# Patient Record
Sex: MALE | Race: WHITE | NOT HISPANIC OR LATINO | Employment: STUDENT | URBAN - METROPOLITAN AREA
[De-identification: names, ages, dates, MRNs, and addresses within clinical notes are randomized per-mention and may not be internally consistent; named-entity substitution may affect disease eponyms.]

---

## 2023-08-29 ENCOUNTER — HOSPITAL ENCOUNTER (EMERGENCY)
Facility: CLINIC | Age: 23
Discharge: HOME | End: 2023-08-30
Attending: EMERGENCY MEDICINE
Payer: COMMERCIAL

## 2023-08-29 DIAGNOSIS — Q67.6 PECTUS EXCAVATUM: ICD-10-CM

## 2023-08-29 DIAGNOSIS — R07.9 CHEST PAIN, UNSPECIFIED TYPE: ICD-10-CM

## 2023-08-29 LAB
ALBUMIN SERPL BCG-MCNC: 5 G/DL (ref 3.5–5.2)
ALBUMIN UR-MCNC: NEGATIVE MG/DL
ALP SERPL-CCNC: 80 U/L (ref 40–129)
ALT SERPL W P-5'-P-CCNC: 13 U/L (ref 0–70)
ANION GAP SERPL CALCULATED.3IONS-SCNC: 14 MMOL/L (ref 7–15)
APPEARANCE UR: CLEAR
AST SERPL W P-5'-P-CCNC: 23 U/L (ref 0–45)
ATRIAL RATE - MUSE: 72 BPM
BACTERIA #/AREA URNS HPF: ABNORMAL /HPF
BASOPHILS # BLD AUTO: 0.1 10E3/UL (ref 0–0.2)
BASOPHILS NFR BLD AUTO: 1 %
BILIRUB SERPL-MCNC: 0.5 MG/DL
BILIRUB UR QL STRIP: NEGATIVE
BUN SERPL-MCNC: 14.6 MG/DL (ref 6–20)
CALCIUM SERPL-MCNC: 9.8 MG/DL (ref 8.6–10)
CHLORIDE SERPL-SCNC: 101 MMOL/L (ref 98–107)
COLOR UR AUTO: ABNORMAL
CREAT SERPL-MCNC: 1.04 MG/DL (ref 0.67–1.17)
DEPRECATED HCO3 PLAS-SCNC: 24 MMOL/L (ref 22–29)
DIASTOLIC BLOOD PRESSURE - MUSE: NORMAL MMHG
EOSINOPHIL # BLD AUTO: 0.2 10E3/UL (ref 0–0.7)
EOSINOPHIL NFR BLD AUTO: 1 %
ERYTHROCYTE [DISTWIDTH] IN BLOOD BY AUTOMATED COUNT: 11.5 % (ref 10–15)
GFR SERPL CREATININE-BSD FRML MDRD: >90 ML/MIN/1.73M2
GLUCOSE SERPL-MCNC: 99 MG/DL (ref 70–99)
GLUCOSE UR STRIP-MCNC: NEGATIVE MG/DL
HCT VFR BLD AUTO: 44.8 % (ref 40–53)
HGB BLD-MCNC: 15 G/DL (ref 13.3–17.7)
HGB UR QL STRIP: NEGATIVE
IMM GRANULOCYTES # BLD: 0.1 10E3/UL
IMM GRANULOCYTES NFR BLD: 0 %
INR PPP: 1.12 (ref 0.85–1.15)
INTERPRETATION ECG - MUSE: NORMAL
KETONES UR STRIP-MCNC: NEGATIVE MG/DL
LEUKOCYTE ESTERASE UR QL STRIP: NEGATIVE
LYMPHOCYTES # BLD AUTO: 2.5 10E3/UL (ref 0.8–5.3)
LYMPHOCYTES NFR BLD AUTO: 19 %
MCH RBC QN AUTO: 30.2 PG (ref 26.5–33)
MCHC RBC AUTO-ENTMCNC: 33.5 G/DL (ref 31.5–36.5)
MCV RBC AUTO: 90 FL (ref 78–100)
MONOCYTES # BLD AUTO: 0.8 10E3/UL (ref 0–1.3)
MONOCYTES NFR BLD AUTO: 6 %
MUCOUS THREADS #/AREA URNS LPF: PRESENT /LPF
NEUTROPHILS # BLD AUTO: 9.2 10E3/UL (ref 1.6–8.3)
NEUTROPHILS NFR BLD AUTO: 73 %
NITRATE UR QL: NEGATIVE
NRBC # BLD AUTO: 0 10E3/UL
NRBC BLD AUTO-RTO: 0 /100
P AXIS - MUSE: 68 DEGREES
PH UR STRIP: 7 [PH] (ref 5–7)
PLATELET # BLD AUTO: 339 10E3/UL (ref 150–450)
POTASSIUM SERPL-SCNC: 3.6 MMOL/L (ref 3.4–5.3)
PR INTERVAL - MUSE: 136 MS
PROT SERPL-MCNC: 8 G/DL (ref 6.4–8.3)
QRS DURATION - MUSE: 88 MS
QT - MUSE: 384 MS
QTC - MUSE: 420 MS
R AXIS - MUSE: 56 DEGREES
RBC # BLD AUTO: 4.96 10E6/UL (ref 4.4–5.9)
RBC URINE: 0 /HPF
SODIUM SERPL-SCNC: 139 MMOL/L (ref 136–145)
SP GR UR STRIP: 1 (ref 1–1.03)
SYSTOLIC BLOOD PRESSURE - MUSE: NORMAL MMHG
T AXIS - MUSE: 33 DEGREES
TROPONIN T SERPL HS-MCNC: <6 NG/L
UROBILINOGEN UR STRIP-MCNC: NORMAL MG/DL
VENTRICULAR RATE- MUSE: 72 BPM
WBC # BLD AUTO: 12.8 10E3/UL (ref 4–11)
WBC URINE: 2 /HPF

## 2023-08-29 PROCEDURE — 85610 PROTHROMBIN TIME: CPT | Performed by: EMERGENCY MEDICINE

## 2023-08-29 PROCEDURE — 80053 COMPREHEN METABOLIC PANEL: CPT | Performed by: EMERGENCY MEDICINE

## 2023-08-29 PROCEDURE — 99285 EMERGENCY DEPT VISIT HI MDM: CPT | Mod: 25 | Performed by: EMERGENCY MEDICINE

## 2023-08-29 PROCEDURE — 93005 ELECTROCARDIOGRAM TRACING: CPT | Performed by: EMERGENCY MEDICINE

## 2023-08-29 PROCEDURE — 84484 ASSAY OF TROPONIN QUANT: CPT | Performed by: EMERGENCY MEDICINE

## 2023-08-29 PROCEDURE — 93010 ELECTROCARDIOGRAM REPORT: CPT | Performed by: EMERGENCY MEDICINE

## 2023-08-29 PROCEDURE — 36415 COLL VENOUS BLD VENIPUNCTURE: CPT | Performed by: EMERGENCY MEDICINE

## 2023-08-29 PROCEDURE — 85025 COMPLETE CBC W/AUTO DIFF WBC: CPT | Performed by: EMERGENCY MEDICINE

## 2023-08-29 PROCEDURE — 81003 URINALYSIS AUTO W/O SCOPE: CPT | Performed by: EMERGENCY MEDICINE

## 2023-08-29 ASSESSMENT — ACTIVITIES OF DAILY LIVING (ADL)
ADLS_ACUITY_SCORE: 33
ADLS_ACUITY_SCORE: 33

## 2023-08-30 ENCOUNTER — APPOINTMENT (OUTPATIENT)
Dept: CT IMAGING | Facility: CLINIC | Age: 23
End: 2023-08-30
Attending: EMERGENCY MEDICINE
Payer: COMMERCIAL

## 2023-08-30 VITALS
HEART RATE: 109 BPM | DIASTOLIC BLOOD PRESSURE: 72 MMHG | OXYGEN SATURATION: 92 % | SYSTOLIC BLOOD PRESSURE: 110 MMHG | TEMPERATURE: 97.8 F | RESPIRATION RATE: 18 BRPM

## 2023-08-30 PROCEDURE — 250N000011 HC RX IP 250 OP 636: Performed by: EMERGENCY MEDICINE

## 2023-08-30 PROCEDURE — 71275 CT ANGIOGRAPHY CHEST: CPT

## 2023-08-30 PROCEDURE — 250N000009 HC RX 250: Performed by: EMERGENCY MEDICINE

## 2023-08-30 PROCEDURE — 71275 CT ANGIOGRAPHY CHEST: CPT | Mod: 26 | Performed by: RADIOLOGY

## 2023-08-30 RX ORDER — IOPAMIDOL 755 MG/ML
90 INJECTION, SOLUTION INTRAVASCULAR ONCE
Status: COMPLETED | OUTPATIENT
Start: 2023-08-30 | End: 2023-08-30

## 2023-08-30 RX ADMIN — IOPAMIDOL 90 ML: 755 INJECTION, SOLUTION INTRAVENOUS at 00:11

## 2023-08-30 RX ADMIN — SODIUM CHLORIDE, PRESERVATIVE FREE 90 ML: 5 INJECTION INTRAVENOUS at 00:16

## 2023-08-30 ASSESSMENT — ACTIVITIES OF DAILY LIVING (ADL): ADLS_ACUITY_SCORE: 35

## 2023-08-30 NOTE — ED TRIAGE NOTES
Presents to the ED with concerns of Center chest pain with pain down the L arm, jaw and tongue tingling. Started earlier today. Has had this symptoms in the past and has been seen for it. However, symptoms tonight are worse than previous episodes. Denies hx of anxiety and drug use.      Triage Assessment       Row Name 08/29/23 1918       Triage Assessment (Adult)    Airway WDL WDL       Respiratory WDL    Respiratory WDL WDL       Skin Circulation/Temperature WDL    Skin Circulation/Temperature WDL WDL       Cardiac WDL    Cardiac WDL WDL       Cognitive/Neuro/Behavioral WDL    Cognitive/Neuro/Behavioral WDL WDL

## 2023-08-30 NOTE — ED PROVIDER NOTES
ED Provider Note  [facility]      History     Chief Complaint   Patient presents with    Chest Pain     HPI  Connor Tejeda is a 23 year old male who has a PMH notable for pectus excavatum (no prior surgical intervention for such), nut allergy, otherwise reportedly healthy, presenting today w/ recurrent chest pain.     Patient presents today in concern for chest discomfort.  He reports he has had similar in the past.  Says was evaluated for such, had ECG and was told was likely inflammation of his ribs/chest wall.  He has not been evaluated for this further. [Outside of his known pectus excavatum, and nut allergy, they report he is otherwise healthy/ has no known other known medical conditions.  No known cardiac diseases/conditions, no contact connective tissue conditions, no known Marfan diagnoses, etc. They report they have looked into at least the some Internet research potential evaluations for his pectus excavatum (at [place], etc.).  They have been told in the past that he will likely need a CT to evaluate this further but I have not yet had that performed.]    Patient reports his chest discomfort has been present consistently throughout the day today (present constantly for hours/all day).has had multiple times in the past.  Previously more intermittent, but same location, same type of discomfort.  Otherwise no new features or changes.  Located central chest, occasional radiation of pain to left jaw, UE. Occasional radiation of pain to the back.  No clear trigger for anything that makes better or worse.  No particular patterns noted for position, eating, activity, etc.  No LH, dizziness, syncope or near syncope. No fevers or chills. No shortness of breath. No palpitations. No extremity pain or swelling.  Has occasionally had tongue tingling sensation, but not currently.  No other numbness, tingling or weakness.  No abdominal pain.  No nausea or vomiting.  No change to bowel or  bladder.  No rashes or skin changes.  No known history of anxiety, no drug use.      No other new symptoms or complaints at this time. Full ROS completed w/o additional findings.           Past Medical History  History reviewed. No pertinent past medical history.  Pectus excavatum, nut allergy  History reviewed. No pertinent surgical history.  None pertinent reported  No current outpatient medications on file.    Allergies   Allergen Reactions    Peanut (Diagnostic) Nausea and Hives    Tree Nuts [Nuts] Nausea and Hives     Family History  History reviewed. No pertinent family history.  Social History   Social History     Tobacco Use    Smoking status: Never    Smokeless tobacco: Never   Substance Use Topics    Alcohol use: Yes     Comment: 1-2x per week    Drug use: Never      Past medical history, past surgical history, medications, allergies, family history, and social history were reviewed with the patient. No additional pertinent items.      A complete review of systems was performed with pertinent positives and negatives noted in the HPI, and all other systems negative.    Physical Exam   BP: 130/77  Pulse: 91  Temp: 97.8  F (36.6  C)  Resp: 18  SpO2: 99 %  Physical Exam  CONSTITUTIONAL: Well-developed and well-nourished. Awake and alert. Non-toxic appearance. No acute distress.  Relatively tall and thin with pectus excavatum noted.  HENT:   - Head: Normocephalic and atraumatic.   - Ears: External ear grossly normal.   - Nose: Nose normal. No rhinorrhea. No epistaxis.   - Mouth/Throat: MMM  EYES: Conjunctivae and lids are normal. No scleral icterus.   NECK: Normal range of motion and phonation normal. Neck supple.  No tracheal deviation, no stridor. No edema or erythema noted.  CARDIOVASCULAR: Normal rate, regular rhythm and no appreciable abnormal heart sounds.  PULMONARY/CHEST: Normal work of breathing. No accessory muscle usage or stridor. No respiratory distress.  No appreciable abnormal breath sounds.   Does have anatomy morphology consistent with pectus excavatum.  No obvious crepitus or abnormal palpation findings.  ABDOMEN: Soft, non-distended. No tenderness. No peritoneal findings, no rigidity, rebound or guarding.  No palpable masses or abnormal pulsatility appreciated.  MUSCULOSKELETAL: Extremities warm and seemingly well perfused. No edema or calf tenderness.  NEUROLOGIC: Awake, alert. Not disoriented. No seizure activity. GCS 15  SKIN: Skin is warm and dry. No rash noted. No diaphoresis. No pallor.   PSYCHIATRIC: Normal mood and affect. Speech and behavior normal. Thought processes linear. Cognition and memory are normal. No SI/HI reported.      ED Course, Procedures, & Data            EKG Interpretation:      Interpreted by Janice Michael MD  Time reviewed: 2030  Symptoms at time of EKG: Chest pain  Rhythm: Sinus  Rate: Normal  Axis: Normal  Ectopy: None  Conduction: RSR' morphology in V1 and V2  ST Segments/ T Waves: No sonia ST elevation or depression.  Does have some RSR prime morphology in V1 and V2, with inverted T wave in V1, slightly lead III  Comparison to prior: vs 4 May 2023 (pt had copy of prior outside ECG with him today); heart rate is increased from 56 bpm to 72 bpm currently.  May have a bit more TWI in lead III currently.  Did seem to have the RSR prime with TWI in V1 on previous ECG (present in V2 as well today, but could be lead placement variability) may have slight extra bump in the T wave of V3, cannot exclude U wave, but looks a bit more notable than on prior ECG (scanning outside ECG into our EMR, media/photo tab)  Clinical Impression: Sinus, some variation though relatively similar to prior as described above            Critical care was not performed.     Medical Decision Making  The patient's presentation was of moderate complexity (an undiagnosed new problem with uncertain diagnosis).    The patient's evaluation involved:  review of external note(s) from 1 sources (see separate  area of note for details)  review of 3+ test result(s) ordered prior to this encounter (see separate area of note for details)  ordering and/or review of 3+ test(s) in this encounter (see separate area of note for details)    The patient's management necessitated moderate to high - offered obs admission, pt declined. He will arrange close outpatient F/U w/ Cards and PCP    Assessment & Plan    IMPRESSION:   23 year old male w/ PMH notable forpectus excavatum (no prior surgical intervention for such), nut allergy, otherwise reportedly healthy, presenting today w/ recurrent chest discomfort.    Clinically, patient appears nontoxic, NAD.  Vitals grossly WNL.  Otherwise on examination, he is noted to have pectus excavatum.  He is relatively thin, somewhat long extremities, not clearly marfanoid, but has some features that make me somewhat suspicious for such.  No other obvious cardiopulmonary findings or abdominal exam findings.  No obvious extremity findings for PE, etc.    Ddx includes, but not limited to, sx's related to pectus excavatum, dissection, does not sound classically pleuritic for PE, but did have recent flight. Also therein does not sound classic for pericarditis, pericardial effusion. Think unlikey PTX, no vomiting for yessy-kimball/Boerrhave. Less likely ACS. No other sx's for dysrhythmia. Considered other causes, pleurisy, costochonritis, et al, but thought to be more diagnosis of exclusion., GI cause, chest wall/MSK, anxiety, et al.     PLAN:   - Screening ECG, laboratory studies, chest imaging   - Risks/benefits of pursuing imaging reviewed with patient and his parents and accepted.   - Dispo pending ED course    RESULTS:  Labs:   - WBC 12.8, but no infectious findings  - Hgb 15.0, troponin less than 6 (and symptoms have been present all day, constantly and so I think the likelihood of ACS to be very low)  -No acute findings on CMP  Urine:   - No apparent UTI, no blood  Imaging: Written preliminary  reports reviewed:  - CT Chest:   1.  No evidence of pulmonary embolism.  2.  No evidence of thoracoabdominal aortic aneurysm or dissection.  3.  9 mm right thyroid nodule, can be further evaluated with thyroid ultrasound if clinically warranted.  4.  Mild pectus excavatum chest deformity.  5.  1 cm enhancing observation of the spleen, indeterminate, could represent small splenic hemangioma or hamartoma. Can be further evaluated with contrast-enhanced MRI on nonemergent basis for more detailed characterization if clinically warranted.    Results/reports reviewed w/ patient who expresses understanding of findings and F/U recommendations. Reviewed incidental findings and provided report copy of imaging. They will arrange further outpatient workup for current sx's as well as incidental findings. (His PCP is at outside facility, but also placed an order to see our Cards team).     RE-EVALUATION:  - The patient's symptoms were improved  - Pt otherwise continues to do well here in the ED, no acute issues or apparent concerning changes in vitals or clinical appearance.    DISCUSSIONS:  - w/ Patient: I have reviewed the available findings, options/recommendations. Offered/recommended Obs, could have Cardiology see here in hospital, etc.. Pt declining obs admission at this time, but understands can change his mind and return at anytime, and especially recommend he immediately return to ED w/ any new/worsening symptoms or any concerns. He does agree to close outpatient F/U for today's visits/sx's as well as the incidental findings. He will see/contact PCP, as well as Cardiology (urgent referral order placed).   - pt awake, alert, not intoxicated, understands risks. Appears to have decision making capacity at this time.   - Given patient wants to leave, reviewed presentation, findings, limitations of findings and to not ignore sx's/needs to continue workup/management, reviewed plan, need for close follow up, strict  return/safety instructions with the patient and his parents. They expressed understanding and agreement with this plan. All questions answered to the best of our ability at this time.       DISPOSITION/PLANNING:  IMPRESSION:   - Chest pain  - Pectus Excavatum  - Incidental thyroid nodule  - Incidental splenic finding  DISPOSITION:  - Discharge   FOLLOW-UP:   - PCP and Cardiology (referral order placed). Pt to call in AM to arrange.   OTHER RECOMMENDATIONS:   - Conservative symptom management, strict return instructions    ______________________________________________________________________              Janice Michael MD  [facility], located within [facility] EMERGENCY DEPARTMENT  8/29/2023     Janice Michael MD  08/30/23 2005

## 2023-08-30 NOTE — DISCHARGE INSTRUCTIONS
TODAY'S VISIT:  You were seen today for chest pain.   - The exact cause of your symptoms is not yet known.  -Therefore, it is very important that you follow-up closely with Primary Care, and we have also made an urgent referral for you to our Cardiology team to be evaluated as well  - You should discuss all imaging/radiology tests and laboratory tests that were performed during this visit with your usual providers to ensure you continue to improve and do not need any further evaluation, testing or management.   - Please call your Primary Care team to discuss and arrange a follow-up appointment.   Immediately return to the nearest Emergency Department with any new or worsening symptoms or concerns.    FOLLOW-UP:  Please make an appointment to follow up with:  - Your Primary Care Provider and Cardiology Clinic (phone: [phone]) as soon as possible (ideally to be seen within the next couple of days).  - If you do not have a primary care provider, you can be seen in follow-up and establish care with one of our providers by calling of the the clinics below:  --- [facility] (phone: [phone])  --- Primary Care / [facility] (phone: [phone])   - Have your provider review the results from today's visit with you again to make sure no further follow-up or additional testing is needed based on those results.     OTHER INSTRUCTIONS:  - Do your best to stay hydrated.    RETURN TO THE EMERGENCY DEPARTMENT  Return to the Emergency Department immediately for any new or worsening symptoms or any concerns.     Remember that you can always come back to the Emergency Department if you are not able to see your regular doctor in the amount of time listed above, if you get any new symptoms, or if there is anything that worries you.

## 2023-09-07 ENCOUNTER — OFFICE VISIT (OUTPATIENT)
Dept: CARDIOLOGY | Facility: CLINIC | Age: 23
End: 2023-09-07
Attending: EMERGENCY MEDICINE
Payer: COMMERCIAL

## 2023-09-07 ENCOUNTER — LAB (OUTPATIENT)
Dept: LAB | Facility: CLINIC | Age: 23
End: 2023-09-07
Payer: COMMERCIAL

## 2023-09-07 VITALS
DIASTOLIC BLOOD PRESSURE: 68 MMHG | HEART RATE: 72 BPM | SYSTOLIC BLOOD PRESSURE: 103 MMHG | OXYGEN SATURATION: 99 % | BODY MASS INDEX: 18.61 KG/M2 | HEIGHT: 70 IN | WEIGHT: 130 LBS

## 2023-09-07 DIAGNOSIS — Q67.6 PECTUS EXCAVATUM: ICD-10-CM

## 2023-09-07 DIAGNOSIS — R07.9 CHEST PAIN, UNSPECIFIED TYPE: ICD-10-CM

## 2023-09-07 LAB — CRP SERPL-MCNC: <3 MG/L

## 2023-09-07 PROCEDURE — 36415 COLL VENOUS BLD VENIPUNCTURE: CPT | Performed by: INTERNAL MEDICINE

## 2023-09-07 PROCEDURE — 86140 C-REACTIVE PROTEIN: CPT | Performed by: INTERNAL MEDICINE

## 2023-09-07 PROCEDURE — 99204 OFFICE O/P NEW MOD 45 MIN: CPT | Performed by: INTERNAL MEDICINE

## 2023-09-07 NOTE — PROGRESS NOTES
CARDIOLOGY CLINIC CONSULTATION    PRIMARY CARE PHYSICIAN:  Physician No Ref-Primary    HISTORY OF PRESENT ILLNESS:  This is a 23-year-old extremely pleasant man who is here with his mother.  The patient denies any prior major medical problems.  He says his pediatrician diagnosed him to have pectus excavatum him a few years ago.  He has not had any significant issues related to that.  More recently the patient has been experiencing chest discomfort and this entire summer.  This pain is positional sometimes and even changes with deep breaths.  This is not exertional in nature.  He denies any sudden cardiac death in his family.  No angina syncope presyncope reported.  He denies any recent febrile illnesses.  He denies any genetic syndromes like Marfan's etc. or any joint mobility issues.    PAST MEDICAL HISTORY:  None pertinent to this cardiology consultation    MEDICATIONS:  No current outpatient medications on file.     No current facility-administered medications for this visit.     None    SOCIAL HISTORY:  I have reviewed this patient's social history and updated it with pertinent information if needed. Connor PENG Ileana  reports that he has never smoked. He has never used smokeless tobacco. He reports current alcohol use. He reports that he does not use drugs.    PHYSICAL EXAM:  Pulse:  [72] 72  BP: (103)/(68) 103/68  SpO2:  [99 %] 99 %  130 lbs 0 oz    Constitutional: alert, no distress  Respiratory: Good bilateral air entry  Cardiovascular: Normal regular heart sounds obvious pectus noted.  JVP is normal there is no edema heart sounds are somewhat displaced GI: nondistended  Neuropsychiatric: appropriate affact    ASSESSMENT: Pertinent issues addressed/ reviewed during this cardiology visit  Chest discomfort likely related to pectus deformity    RECOMMENDATIONS:  Patient's chest pain definitely does not sound anginal in nature.  Similarly I do not think this is myopericarditis in origin.  I think it is reasonable  to get a exercise stress echocardiogram and CRP levels.  However I personally recommend referral to [facility] or [facility] in the thoracic clinic for an opinion about a significant pectus deformity.   We will contact the patient with the results of his stress test.    It was a pleasure seeing this patient in clinic today. Please do not hesitate to contact me with any future questions.     ANTONY Narvaez, [facility]  Cardiology - [facility]  September 7, 2023    Review of the result(s) of each unique test - Last ECG lipids and BMP CBC     The level of medical decision making during this visit was of moderate complexity.    This note was completed in part using dictation via the Dragon voice recognition software. Some word and grammatical errors may occur and must be interpreted in the appropriate clinical context.  If there are any questions pertaining to this issue, please contact me for further clarification.   Anesthesia Volume In Cc: 3

## 2023-09-07 NOTE — LETTER
9/7/2023    Physician No Ref-Primary  No address on file    RE: Connor Tejeda       Dear Colleague,     I had the pleasure of seeing Connor Tejeda in the [facility].  CARDIOLOGY CLINIC CONSULTATION    PRIMARY CARE PHYSICIAN:  Physician No Ref-Primary    HISTORY OF PRESENT ILLNESS:  This is a 23-year-old extremely pleasant man who is here with his mother.  The patient denies any prior major medical problems.  He says his pediatrician diagnosed him to have pectus excavatum him a few years ago.  He has not had any significant issues related to that.  More recently the patient has been experiencing chest discomfort and this entire summer.  This pain is positional sometimes and even changes with deep breaths.  This is not exertional in nature.  He denies any sudden cardiac death in his family.  No angina syncope presyncope reported.  He denies any recent febrile illnesses.  He denies any genetic syndromes like Marfan's etc. or any joint mobility issues.    PAST MEDICAL HISTORY:  None pertinent to this cardiology consultation    MEDICATIONS:  No current outpatient medications on file.     No current facility-administered medications for this visit.     None    SOCIAL HISTORY:  I have reviewed this patient's social history and updated it with pertinent information if needed. Connor Tejeda  reports that he has never smoked. He has never used smokeless tobacco. He reports current alcohol use. He reports that he does not use drugs.    PHYSICAL EXAM:  Pulse:  [72] 72  BP: (103)/(68) 103/68  SpO2:  [99 %] 99 %  130 lbs 0 oz    Constitutional: alert, no distress  Respiratory: Good bilateral air entry  Cardiovascular: Normal regular heart sounds obvious pectus noted.  JVP is normal there is no edema heart sounds are somewhat displaced GI: nondistended  Neuropsychiatric: appropriate affact    ASSESSMENT: Pertinent issues addressed/ reviewed during this cardiology visit  Chest discomfort likely related  to pectus deformity    RECOMMENDATIONS:  Patient's chest pain definitely does not sound anginal in nature.  Similarly I do not think this is myopericarditis in origin.  I think it is reasonable to get a exercise stress echocardiogram and CRP levels.  However I personally recommend referral to [facility] or [facility] in the thoracic clinic for an opinion about a significant pectus deformity.   We will contact the patient with the results of his stress test.    It was a pleasure seeing this patient in clinic today. Please do not hesitate to contact me with any future questions.     ANTONY Narvaez, [facility]  Cardiology - [facility]  September 7, 2023    Review of the result(s) of each unique test - Last ECG lipids and BMP CBC     The level of medical decision making during this visit was of moderate complexity.    This note was completed in part using dictation via the Dragon voice recognition software. Some word and grammatical errors may occur and must be interpreted in the appropriate clinical context.  If there are any questions pertaining to this issue, please contact me for further clarification.    Thank you for allowing me to participate in the care of your patient.      Sincerely,     Azra Licona MD     [facility]  cc:   Janice Michael MD  [address] [place] [address]

## 2023-09-08 ENCOUNTER — PATIENT OUTREACH (OUTPATIENT)
Dept: ONCOLOGY | Facility: CLINIC | Age: 23
End: 2023-09-08
Payer: COMMERCIAL

## 2023-09-08 ENCOUNTER — CARE COORDINATION (OUTPATIENT)
Dept: CARDIOLOGY | Facility: CLINIC | Age: 23
End: 2023-09-08
Payer: COMMERCIAL

## 2023-09-08 NOTE — PROGRESS NOTES
New Patient Oncology Nurse Navigator Note     Referring provider: Dr. Azra Licona    Referring Clinic/Organization: [facility]  Referred to: Thoracic Surgery  Requested provider (if applicable): First available - did not specify   Referral Received: 09/08/23       Evaluation for :   Diagnosis   R07.9 (ICD-10-CM) - Chest pain, unspecified type   Q67.6 (ICD-10-CM) - Pectus excavatum     Clinical History (per Nurse review of records provided):      09/08/2023 CTA Chest w/ contrast (bookmarked) showed:   IMPRESSION:  1.  No evidence of pulmonary embolism.  2.  No evidence of thoracoabdominal aortic aneurysm or dissection.  3.  9 mm right thyroid nodule, can be further evaluated with thyroid ultrasound if clinically warranted.  4.  Mild pectus excavatum chest deformity.  5.  1 cm enhancing observation of the spleen, indeterminate, could represent small splenic hemangioma or hamartoma. Can be further evaluated with contrast-enhanced MRI on nonemergent basis for more detailed characterization if clinically warranted.    Clinical Assessment / Barriers to Care (Per Nurse):    Never smoker  Records Location: [facility]   Records Needed: None  Additional testing needed prior to consult: Exercise ECHO (scheduled 9/11)    YOSEF FowlerN, RN, OCN  [facility] Oncology Nurse Navigator  [phone] / [phone]

## 2023-09-08 NOTE — PROGRESS NOTES
Will route request to CARYN Guerra Nurse Navigator, who placed a note regarding incoming consult from Dr. Licona. Danielle Chan RN on 9/8/2023 at 12:30 PM        Result Care Coordination      Result Notes     Azra Licona MD  9/8/2023  9:28 AM CDT Back to Top      Thank you. Normal levels.     I spoke with Dr. Tiffany Pink to get an opinion on his CT pectus and chest pain. She recommended a referral to Dr. Balbina Rogers with thoracic. Can we help with that thanks, K    Danielle Chan RN  9/7/2023  4:33 PM CDT       FYI to MD. Pt sent mychart result note w/normal result.

## 2023-09-11 ENCOUNTER — HOSPITAL ENCOUNTER (OUTPATIENT)
Dept: CARDIOLOGY | Facility: CLINIC | Age: 23
Discharge: HOME | End: 2023-09-11
Attending: INTERNAL MEDICINE | Admitting: INTERNAL MEDICINE
Payer: COMMERCIAL

## 2023-09-11 DIAGNOSIS — R07.9 CHEST PAIN, UNSPECIFIED TYPE: ICD-10-CM

## 2023-09-11 DIAGNOSIS — Q67.6 PECTUS EXCAVATUM: ICD-10-CM

## 2023-09-11 PROCEDURE — 93017 CV STRESS TEST TRACING ONLY: CPT

## 2023-09-11 PROCEDURE — 93016 CV STRESS TEST SUPVJ ONLY: CPT | Performed by: INTERNAL MEDICINE

## 2023-09-11 PROCEDURE — 93350 STRESS TTE ONLY: CPT | Mod: 26 | Performed by: INTERNAL MEDICINE

## 2023-09-11 PROCEDURE — 255N000002 HC RX 255 OP 636: Performed by: INTERNAL MEDICINE

## 2023-09-11 PROCEDURE — 93018 CV STRESS TEST I&R ONLY: CPT | Performed by: INTERNAL MEDICINE

## 2023-09-11 PROCEDURE — 93321 DOPPLER ECHO F-UP/LMTD STD: CPT | Mod: 26 | Performed by: INTERNAL MEDICINE

## 2023-09-11 PROCEDURE — 93325 DOPPLER ECHO COLOR FLOW MAPG: CPT | Mod: 26 | Performed by: INTERNAL MEDICINE

## 2023-09-11 PROCEDURE — 93325 DOPPLER ECHO COLOR FLOW MAPG: CPT | Mod: TC

## 2023-09-11 RX ADMIN — PERFLUTREN 5 ML: 6.52 INJECTION, SUSPENSION INTRAVENOUS at 08:14

## 2023-09-12 NOTE — PROGRESS NOTES
Routed to Dr. Licona. Stress Echo completed 9/11/23. Awaiting consult w/Dr. Rogers at the . Danielle Chan RN on 9/12/2023 at 3:36 PM        BSA: 1.7 m2  Height: 70 in  Weight: 130 lb  HR: 59  BP: 125/69 mmHg  ______________________________________________________________________________  Procedure  Stress Echo Bike with two dimensional, color and spectral Doppler performed.  Contrast Definity. Contrast Optison.  ______________________________________________________________________________  Interpretation Summary  This was a normal stress echocardiogram with no evidence of stress-induced  ischemia. Normal resting LV function with EF of approximately 60-65%; normal  response to exercise with increase to approximately 70-75%. No stress induced  regional wall motion abnormalities. No ECG evidence of ischemia. No  significant valvular disease noted on routine screening color flow Doppler and  pulsed Doppler examination. Normal functional capacity. Target Heart Rate was  achieved.  Exercise was stopped due to leg fatigue.  The patient did not exhibit any symptoms during exercise.  Normal blood pressure response with stress.  Normal heart rate response to exercise.

## 2023-09-13 NOTE — PROGRESS NOTES
Azra Licona MD  You23 hours ago (3:48 PM)     KV  Please get an opinion from thoracic.  No further cardiac work-up recommended from my side.         Note       Pt scheduled w/Dr. Rogers on 9/27/23. Informed pt via [employer] note that the 9/11/23 stress echo reported as:    Interpretation Summary  This was a normal stress echocardiogram with no evidence of stress-induced  ischemia.     Await Dr. Rogers consult, no changes per Dr. Licona. Danielle Chan RN on 9/13/2023 at 3:17 PM

## 2023-09-20 ASSESSMENT — PATIENT HEALTH QUESTIONNAIRE - PHQ9
SUM OF ALL RESPONSES TO PHQ QUESTIONS 1-9: 7
10. IF YOU CHECKED OFF ANY PROBLEMS, HOW DIFFICULT HAVE THESE PROBLEMS MADE IT FOR YOU TO DO YOUR WORK, TAKE CARE OF THINGS AT HOME, OR GET ALONG WITH OTHER PEOPLE: SOMEWHAT DIFFICULT
SUM OF ALL RESPONSES TO PHQ QUESTIONS 1-9: 7

## 2023-09-21 ENCOUNTER — OFFICE VISIT (OUTPATIENT)
Dept: FAMILY MEDICINE | Facility: CLINIC | Age: 23
End: 2023-09-21
Payer: COMMERCIAL

## 2023-09-21 VITALS
WEIGHT: 127 LBS | RESPIRATION RATE: 12 BRPM | SYSTOLIC BLOOD PRESSURE: 112 MMHG | HEIGHT: 70 IN | OXYGEN SATURATION: 97 % | HEART RATE: 78 BPM | BODY MASS INDEX: 18.18 KG/M2 | DIASTOLIC BLOOD PRESSURE: 78 MMHG

## 2023-09-21 DIAGNOSIS — D72.829 LEUKOCYTOSIS, UNSPECIFIED TYPE: ICD-10-CM

## 2023-09-21 DIAGNOSIS — Z11.59 NEED FOR HEPATITIS C SCREENING TEST: ICD-10-CM

## 2023-09-21 DIAGNOSIS — E04.1 THYROID NODULE: ICD-10-CM

## 2023-09-21 DIAGNOSIS — R07.9 CHEST PAIN, UNSPECIFIED TYPE: ICD-10-CM

## 2023-09-21 DIAGNOSIS — Z11.4 SCREENING FOR HIV (HUMAN IMMUNODEFICIENCY VIRUS): Primary | ICD-10-CM

## 2023-09-21 DIAGNOSIS — R93.89 ABNORMAL CT SCAN: ICD-10-CM

## 2023-09-21 LAB
BASOPHILS # BLD AUTO: 0.1 10E3/UL (ref 0–0.2)
BASOPHILS NFR BLD AUTO: 1 %
EOSINOPHIL # BLD AUTO: 0.3 10E3/UL (ref 0–0.7)
EOSINOPHIL NFR BLD AUTO: 5 %
ERYTHROCYTE [DISTWIDTH] IN BLOOD BY AUTOMATED COUNT: 11.3 % (ref 10–15)
HCT VFR BLD AUTO: 41.4 % (ref 40–53)
HGB BLD-MCNC: 14.2 G/DL (ref 13.3–17.7)
IMM GRANULOCYTES # BLD: 0 10E3/UL
IMM GRANULOCYTES NFR BLD: 0 %
LYMPHOCYTES # BLD AUTO: 2.2 10E3/UL (ref 0.8–5.3)
LYMPHOCYTES NFR BLD AUTO: 34 %
MCH RBC QN AUTO: 30.9 PG (ref 26.5–33)
MCHC RBC AUTO-ENTMCNC: 34.3 G/DL (ref 31.5–36.5)
MCV RBC AUTO: 90 FL (ref 78–100)
MONOCYTES # BLD AUTO: 0.5 10E3/UL (ref 0–1.3)
MONOCYTES NFR BLD AUTO: 7 %
NEUTROPHILS # BLD AUTO: 3.5 10E3/UL (ref 1.6–8.3)
NEUTROPHILS NFR BLD AUTO: 54 %
PLATELET # BLD AUTO: 244 10E3/UL (ref 150–450)
RBC # BLD AUTO: 4.59 10E6/UL (ref 4.4–5.9)
WBC # BLD AUTO: 6.5 10E3/UL (ref 4–11)

## 2023-09-21 PROCEDURE — 99204 OFFICE O/P NEW MOD 45 MIN: CPT | Performed by: FAMILY MEDICINE

## 2023-09-21 PROCEDURE — 36415 COLL VENOUS BLD VENIPUNCTURE: CPT | Performed by: FAMILY MEDICINE

## 2023-09-21 PROCEDURE — 85025 COMPLETE CBC W/AUTO DIFF WBC: CPT | Performed by: FAMILY MEDICINE

## 2023-09-21 RX ORDER — EPINEPHRINE 0.3 MG/.3ML
INJECTION SUBCUTANEOUS
COMMUNITY
End: 2024-05-10

## 2023-09-21 ASSESSMENT — PAIN SCALES - GENERAL: PAINLEVEL: MILD PAIN (2)

## 2023-09-21 NOTE — PROGRESS NOTES
Assessment & Plan     Thyroid nodule  - US Thyroid; Future    Abnormal CT scan  - MR Abdomen w Contrast; Future    Leukocytosis, unspecified type  - CBC with platelets and differential; Future  - CBC with platelets and differential    Chest pain, unspecified type  - REVIEW OF HEALTH MAINTENANCE PROTOCOL ORDERS       MED REC REQUIREDPost Medication Reconciliation Status: discharge medications reconciled, continue medications without change    Tapan Yeager DO  [facility] CRYSTAL Ryan is a 23 year old, presenting for the following health issues:  Hospital F/U        9/21/2023     3:09 PM   Additional Questions   Roomed by EVELYN JOHANSEN RN     Answers submitted by the patient for this visit:  Patient Health Questionnaire (Submitted on 9/20/2023)  If you checked off any problems, how difficult have these problems made it for you to do your work, take care of things at home, or get along with other people?: Somewhat difficult  PHQ9 TOTAL SCORE: 7    HPI     ER for chest pain   Feeling better    On CT found thyroid nodule   No symptoms     1cm lesion on spleen   Elevated white count     Hospital Follow-up Visit:    Hospital/Nursing Home/IP Rehab Facility: [facility]  Date of Admission: 8/29/2023  Date of Discharge: 8/30/2023  Reason(s) for Admission: Chest pain    Was your hospitalization related to COVID-19? No   Problems taking medications regularly:  None  Medication changes since discharge: None  Problems adhering to non-medication therapy: None    Summary of hospitalization:  [facility] discharge summary reviewed  Diagnostic Tests/Treatments reviewed.  Follow up needed: none  Other Healthcare Providers Involved in Patient s Care:         None  Update since discharge: improved.         Plan of care communicated with patient       Objective    /78 (BP Location: Right arm, Patient Position: Sitting, Cuff Size: Adult  "Regular)   Pulse 78   Resp 12   Ht 1.778 m (5' 10\")   Wt 57.6 kg (127 lb)   SpO2 97%   BMI 18.22 kg/m    Body mass index is 18.22 kg/m .  Physical Exam  Constitutional:       General: He is not in acute distress.  Eyes:      General: No scleral icterus.  Pulmonary:      Effort: No respiratory distress.   Neurological:      Mental Status: He is alert.   Psychiatric:         Mood and Affect: Mood normal.         Behavior: Behavior normal.                        "

## 2023-09-21 NOTE — TELEPHONE ENCOUNTER
466  RECORDS STATUS - ALL OTHER DIAGNOSIS      RECORDS RECEIVED FROM: [facility]/   DATE RECEIVED:    NOTES STATUS DETAILS   OFFICE NOTE from referring provider Epic 9/7/23: Dr. Azra Licona   OFFICE NOTE from PCP -  5/4/23: Dr. Yvonne Munoz   DISCHARGE REPORT from the ER [facility] 8/29/23: [facility] ED   MEDICATION LIST [facility]    LABS     ANYTHING RELATED TO DIAGNOSIS Epic    IMAGING (NEED IMAGES & REPORT)     CT SCANS PACS 8/30/23: [facility] Chest

## 2023-09-27 ENCOUNTER — ONCOLOGY VISIT (OUTPATIENT)
Dept: SURGERY | Facility: CLINIC | Age: 23
End: 2023-09-27
Attending: INTERNAL MEDICINE
Payer: COMMERCIAL

## 2023-09-27 ENCOUNTER — PRE VISIT (OUTPATIENT)
Dept: SURGERY | Facility: CLINIC | Age: 23
End: 2023-09-27
Payer: COMMERCIAL

## 2023-09-27 VITALS
DIASTOLIC BLOOD PRESSURE: 72 MMHG | WEIGHT: 128 LBS | HEIGHT: 70 IN | OXYGEN SATURATION: 96 % | BODY MASS INDEX: 18.32 KG/M2 | HEART RATE: 83 BPM | SYSTOLIC BLOOD PRESSURE: 113 MMHG | TEMPERATURE: 97.4 F | RESPIRATION RATE: 16 BRPM

## 2023-09-27 DIAGNOSIS — R07.9 CHEST PAIN, UNSPECIFIED TYPE: ICD-10-CM

## 2023-09-27 DIAGNOSIS — Q67.6 PECTUS EXCAVATUM: ICD-10-CM

## 2023-09-27 PROCEDURE — G0463 HOSPITAL OUTPT CLINIC VISIT: HCPCS | Performed by: THORACIC SURGERY (CARDIOTHORACIC VASCULAR SURGERY)

## 2023-09-27 PROCEDURE — 99203 OFFICE O/P NEW LOW 30 MIN: CPT | Performed by: THORACIC SURGERY (CARDIOTHORACIC VASCULAR SURGERY)

## 2023-09-27 ASSESSMENT — PAIN SCALES - GENERAL: PAINLEVEL: MILD PAIN (3)

## 2023-09-27 NOTE — PROGRESS NOTES
"THORACIC SURGERY - NEW PATIENT OFFICE VISIT      Dear Dr. Licona,    I saw Ileana at Dr. Licona s request in consultation for the evaluation and treatment of Pectus Excavatum.     HPI  Mr. Connor Tejeda is a 23 year old man with Pectus Excavatum, having chest pain, cardiac etiology ruled out with stress echo, so patient was referred to thoracic surgery for further discussion. Chest pain has been severe over this summer, and almost every day. Denies any genetic syndromes in the family           Previsit Tests   CT scan: [facility] 3.6      Exercise echo 9/11/23: Normal BP and HR response to exercise.    PMH  None    PSH  None       Allergies   Allergen Reactions    Mallory (Diagnostic) Hives and Other (See Comments)     Vomiting    Peanut-Containing Drug Products Hives    Peanut (Diagnostic) Nausea and Hives    Tree Nuts [Nuts] Nausea and Hives     Current Outpatient Medications   Medication    EPINEPHrine (ANY BX GENERIC EQUIV) 0.3 MG/0.3ML injection 2-pack     No current facility-administered medications for this visit.     Social History     Tobacco Use    Smoking status: Never     Passive exposure: Never    Smokeless tobacco: Never   Vaping Use    Vaping Use: Never used   Substance Use Topics    Alcohol use: Yes     Comment: 1-2x per week    Drug use: Never         Physical examination  /72   Pulse 83   Temp 97.4  F (36.3  C) (Oral)   Resp 16   Ht 1.77 m (5' 9.69\")   Wt 58.1 kg (128 lb)   SpO2 96%   BMI 18.53 kg/m    Alert and oriented NAD.   Bilateral breath sounds.     From a personal perspective, he comes to clinic alone. He is a student at the [employer].     IMPRESSION    23 year old male with pectus excavatum.    PLAN  I spent 30 min on the date of the encounter in chart review, patient visit, review of tests, documentation and/or discussion with other providers about the issues documented above. I reviewed the plan as follows:  I discussed the natural history of pectus excavatum and the treatment " options. I explained to him that he does have severe pectus, however I cannot guarantee that his main symptom (chest pain) will resolve after surgery. His exercise tolerance is fairly strong at a baseline. We discussed the rationale for surgery, the alternatives risks and benefits of the procedure. I explained the expected hospital stay, postoperative course, recovery time, diet and activity restrictions.   He would like to think about it and will let us know what he decides.     All questions were answered and Connor Tejeda and present family were in agreement with the plan.  I appreciate the opportunity to participate in the care of your patient and will keep you updated.  Sincerely,  Balbina Felipe MD

## 2023-09-27 NOTE — LETTER
"    9/27/2023         RE: Connor Tejeda  [address] [facility] [place] [address]        Dear Colleague,    Thank you for referring your patient, Connor Tejeda, to the [facility]. Please see a copy of my visit note below.    THORACIC SURGERY - NEW PATIENT OFFICE VISIT      Dear Dr. Licona,    I saw Ileana at Dr. Licona s request in consultation for the evaluation and treatment of Pectus Excavatum.     HPI  Mr. Connor Tejeda is a 23 year old man with Pectus Excavatum, having chest pain, cardiac etiology ruled out with stress echo, so patient was referred to thoracic surgery for further discussion. Chest pain has been severe over this summer, and almost every day. Denies any genetic syndromes in the family           Previsit Tests   CT scan: [facility] 3.6      Exercise echo 9/11/23: Normal BP and HR response to exercise.    PMH  None    PSH  None       Allergies   Allergen Reactions    [place]gaard (Diagnostic) Hives and Other (See Comments)     Vomiting    Peanut-Containing Drug Products Hives    Peanut (Diagnostic) Nausea and Hives    Tree Nuts [Nuts] Nausea and Hives     Current Outpatient Medications   Medication    EPINEPHrine (ANY BX GENERIC EQUIV) 0.3 MG/0.3ML injection 2-pack     No current facility-administered medications for this visit.     Social History     Tobacco Use    Smoking status: Never     Passive exposure: Never    Smokeless tobacco: Never   Vaping Use    Vaping Use: Never used   Substance Use Topics    Alcohol use: Yes     Comment: 1-2x per week    Drug use: Never         Physical examination  /72   Pulse 83   Temp 97.4  F (36.3  C) (Oral)   Resp 16   Ht 1.77 m (5' 9.69\")   Wt 58.1 kg (128 lb)   SpO2 96%   BMI 18.53 kg/m    Alert and oriented NAD.   Bilateral breath sounds.     From a personal perspective, he comes to clinic alone. He is a student at the [employer].     IMPRESSION    23 year old male with pectus excavatum.    PLAN  I spent 30 min on the date " of the encounter in chart review, patient visit, review of tests, documentation and/or discussion with other providers about the issues documented above. I reviewed the plan as follows:  I discussed the natural history of pectus excavatum and the treatment options. I explained to him that he does have severe pectus, however I cannot guarantee that his main symptom (chest pain) will resolve after surgery. His exercise tolerance is fairly strong at a baseline. We discussed the rationale for surgery, the alternatives risks and benefits of the procedure. I explained the expected hospital stay, postoperative course, recovery time, diet and activity restrictions.   He would like to think about it and will let us know what he decides.     All questions were answered and Michael Duthie and present family were in agreement with the plan.  I appreciate the opportunity to participate in the care of your patient and will keep you updated.  Sincerely,  Balbina Felipe MD

## 2023-10-14 ENCOUNTER — HEALTH MAINTENANCE LETTER (OUTPATIENT)
Age: 23
End: 2023-10-14

## 2023-10-20 ENCOUNTER — HOSPITAL ENCOUNTER (OUTPATIENT)
Dept: ULTRASOUND IMAGING | Facility: CLINIC | Age: 23
Discharge: HOME | End: 2023-10-20
Attending: FAMILY MEDICINE
Payer: COMMERCIAL

## 2023-10-20 ENCOUNTER — HOSPITAL ENCOUNTER (OUTPATIENT)
Dept: MRI IMAGING | Facility: CLINIC | Age: 23
Discharge: HOME | End: 2023-10-20
Attending: FAMILY MEDICINE
Payer: COMMERCIAL

## 2023-10-20 DIAGNOSIS — E04.1 THYROID NODULE: ICD-10-CM

## 2023-10-20 DIAGNOSIS — R93.89 ABNORMAL CT SCAN: ICD-10-CM

## 2023-10-20 PROCEDURE — 74183 MRI ABD W/O CNTR FLWD CNTR: CPT | Mod: 26 | Performed by: RADIOLOGY

## 2023-10-20 PROCEDURE — 76536 US EXAM OF HEAD AND NECK: CPT

## 2023-10-20 PROCEDURE — 76536 US EXAM OF HEAD AND NECK: CPT | Mod: 26 | Performed by: RADIOLOGY

## 2023-10-20 PROCEDURE — 74183 MRI ABD W/O CNTR FLWD CNTR: CPT

## 2023-10-20 PROCEDURE — 255N000002 HC RX 255 OP 636: Mod: JZ | Performed by: FAMILY MEDICINE

## 2023-10-20 PROCEDURE — A9585 GADOBUTROL INJECTION: HCPCS | Mod: JZ | Performed by: FAMILY MEDICINE

## 2023-10-20 RX ORDER — GADOBUTROL 604.72 MG/ML
6 INJECTION INTRAVENOUS ONCE
Status: COMPLETED | OUTPATIENT
Start: 2023-10-20 | End: 2023-10-20

## 2023-10-20 RX ADMIN — GADOBUTROL 6 ML: 604.72 INJECTION INTRAVENOUS at 13:20

## 2023-12-19 ENCOUNTER — IMMUNIZATION (OUTPATIENT)
Dept: FAMILY MEDICINE | Facility: CLINIC | Age: 23
End: 2023-12-19
Payer: COMMERCIAL

## 2023-12-19 DIAGNOSIS — Z23 ENCOUNTER FOR IMMUNIZATION: Primary | ICD-10-CM

## 2023-12-19 PROCEDURE — 99207 PR NO CHARGE NURSE ONLY: CPT

## 2023-12-19 PROCEDURE — 90480 ADMN SARSCOV2 VAC 1/ONLY CMP: CPT

## 2023-12-19 PROCEDURE — 90471 IMMUNIZATION ADMIN: CPT

## 2023-12-19 PROCEDURE — 90686 IIV4 VACC NO PRSV 0.5 ML IM: CPT

## 2023-12-19 PROCEDURE — 91320 SARSCV2 VAC 30MCG TRS-SUC IM: CPT

## 2023-12-19 NOTE — PROGRESS NOTES
Prior to immunization administration, verified patients identity using patient s name and date of birth. Please see Immunization Activity for additional information.     Screening Questionnaire for Adult Immunization    Are you sick today?   No   Do you have allergies to medications, food, a vaccine component or latex?   No   Have you ever had a serious reaction after receiving a vaccination?   No   Do you have a long-term health problem with heart, lung, kidney, or metabolic disease (e.g., diabetes), asthma, a blood disorder, no spleen, complement component deficiency, a cochlear implant, or a spinal fluid leak?  Are you on long-term aspirin therapy?   No   Do you have cancer, leukemia, HIV/AIDS, or any other immune system problem?   No   Do you have a parent, brother, or sister with an immune system problem?   No   In the past 3 months, have you taken medications that affect  your immune system, such as prednisone, other steroids, or anticancer drugs; drugs for the treatment of rheumatoid arthritis, Crohn s disease, or psoriasis; or have you had radiation treatments?   No   Have you had a seizure, or a brain or other nervous system problem?   No   During the past year, have you received a transfusion of blood or blood    products, or been given immune (gamma) globulin or antiviral drug?   No   For women: Are you pregnant or is there a chance you could become       pregnant during the next month?   No   Have you received any vaccinations in the past 4 weeks?   No     Immunization questionnaire answers were all negative.    I have reviewed the following standing orders:   This patient is due and qualifies for the Covid-19 vaccine.     Click here for COVID-19 Standing Order    I have reviewed the vaccines inclusion and exclusion criteria; No concerns regarding eligibility.     This patient is due and qualifies for the Influenza vaccine.    Click here for Influenza Vaccine Standing Order    I have reviewed the  vaccines inclusion and exclusion criteria; No concerns regarding eligibility.     Patient instructed to remain in clinic for 15 minutes afterwards, and to report any adverse reactions.     Screening performed by Son Talamantes RN on 12/19/2023 at 10:24 AM.

## 2024-05-10 ENCOUNTER — OFFICE VISIT (OUTPATIENT)
Dept: FAMILY MEDICINE | Facility: CLINIC | Age: 24
End: 2024-05-10
Payer: COMMERCIAL

## 2024-05-10 VITALS
HEART RATE: 74 BPM | TEMPERATURE: 97.5 F | HEIGHT: 70 IN | RESPIRATION RATE: 7 BRPM | OXYGEN SATURATION: 97 % | WEIGHT: 130.5 LBS | SYSTOLIC BLOOD PRESSURE: 110 MMHG | BODY MASS INDEX: 18.68 KG/M2 | DIASTOLIC BLOOD PRESSURE: 71 MMHG

## 2024-05-10 DIAGNOSIS — Z91.010 PEANUT ALLERGY: ICD-10-CM

## 2024-05-10 DIAGNOSIS — Z23 NEED FOR VACCINATION: ICD-10-CM

## 2024-05-10 DIAGNOSIS — Z00.00 ROUTINE GENERAL MEDICAL EXAMINATION AT A HEALTH CARE FACILITY: Primary | ICD-10-CM

## 2024-05-10 DIAGNOSIS — Z91.018 TREE NUT ALLERGY: ICD-10-CM

## 2024-05-10 PROBLEM — Q67.6 PECTUS EXCAVATUM: Status: ACTIVE | Noted: 2024-05-10

## 2024-05-10 PROCEDURE — 90715 TDAP VACCINE 7 YRS/> IM: CPT | Performed by: FAMILY MEDICINE

## 2024-05-10 PROCEDURE — 99395 PREV VISIT EST AGE 18-39: CPT | Mod: 25 | Performed by: FAMILY MEDICINE

## 2024-05-10 PROCEDURE — 90471 IMMUNIZATION ADMIN: CPT | Performed by: FAMILY MEDICINE

## 2024-05-10 RX ORDER — EPINEPHRINE 0.3 MG/.3ML
0.3 INJECTION SUBCUTANEOUS PRN
Qty: 2 EACH | Refills: 11 | Status: SHIPPED | OUTPATIENT
Start: 2024-05-10

## 2024-05-10 SDOH — HEALTH STABILITY: PHYSICAL HEALTH: ON AVERAGE, HOW MANY MINUTES DO YOU ENGAGE IN EXERCISE AT THIS LEVEL?: 30 MIN

## 2024-05-10 SDOH — HEALTH STABILITY: PHYSICAL HEALTH: ON AVERAGE, HOW MANY DAYS PER WEEK DO YOU ENGAGE IN MODERATE TO STRENUOUS EXERCISE (LIKE A BRISK WALK)?: 5 DAYS

## 2024-05-10 ASSESSMENT — SOCIAL DETERMINANTS OF HEALTH (SDOH): HOW OFTEN DO YOU GET TOGETHER WITH FRIENDS OR RELATIVES?: TWICE A WEEK

## 2024-05-10 ASSESSMENT — PAIN SCALES - GENERAL: PAINLEVEL: NO PAIN (0)

## 2024-05-10 NOTE — PROGRESS NOTES
Preventive Care Visit  [facility]  Tapan Yeager DO, Family Medicine  May 10, 2024      Assessment & Plan     Routine general medical examination at a health care facility  Well adult     Peanut allergy  - EPINEPHrine (ANY BX GENERIC EQUIV) 0.3 MG/0.3ML injection 2-pack; Inject 0.3 mLs (0.3 mg) into the muscle as needed for anaphylaxis May repeat one time in 5-15 minutes if response to initial dose is inadequate.    Tree nut allergy  - EPINEPHrine (ANY BX GENERIC EQUIV) 0.3 MG/0.3ML injection 2-pack; Inject 0.3 mLs (0.3 mg) into the muscle as needed for anaphylaxis May repeat one time in 5-15 minutes if response to initial dose is inadequate.    Need for vaccination  - TDAP 10-64Y (ADACEL,BOOSTRIX)    Counseling  Appropriate preventive services were discussed with this patient, including applicable screening as appropriate for fall prevention, nutrition, physical activity, Tobacco-use cessation, weight loss and cognition.  Checklist reviewing preventive services available has been given to the patient.  Reviewed patient's diet, addressing concerns and/or questions.   He is at risk for psychosocial distress and has been provided with information to reduce risk.     Leonardo Ryan is a 23 year old, presenting for the following:  Physical        5/10/2024     9:11 AM   Additional Questions   Roomed by Mile ALVAREZ        HPI    Peanut and  tree nut  allergy  No history of severe anaphylaxis    Needs refill of epi  pen              5/10/2024   General Health   How would you rate your overall physical health? Good   Feel stress (tense, anxious, or unable to sleep) Only a little   (!) STRESS CONCERN      5/10/2024   Nutrition   Three or more servings of calcium each day? Yes   Diet: Regular (no restrictions)   How many servings of fruit and vegetables per day? (!) 2-3   How many sweetened beverages each day? 0-1         5/10/2024   Exercise   Days per week of moderate/strenous  "exercise 5 days   Average minutes spent exercising at this level 30 min         5/10/2024   Social Factors   Frequency of gathering with friends or relatives Twice a week   Worry food won't last until get money to buy more No   Food not last or not have enough money for food? No   Do you have housing?  Yes   Are you worried about losing your housing? No   Lack of transportation? No   Unable to get utilities (heat,electricity)? No         5/10/2024   Dental   Dentist two times every year? Yes         5/10/2024   TB Screening   Were you born outside of the [place]? No         Today's PHQ-2 Score:       5/10/2024     7:57 AM   PHQ-2 ( 1999 Pfizer)   Q1: Little interest or pleasure in doing things 1   Q2: Feeling down, depressed or hopeless 1   PHQ-2 Score 2   Q1: Little interest or pleasure in doing things Several days   Q2: Feeling down, depressed or hopeless Several days   PHQ-2 Score 2           5/10/2024   Substance Use   Alcohol more than 3/day or more than 7/wk No   Do you use any other substances recreationally? No     Social History     Tobacco Use    Smoking status: Never     Passive exposure: Never    Smokeless tobacco: Never   Vaping Use    Vaping status: Never Used   Substance Use Topics    Alcohol use: Yes     Comment: 1-2x per week    Drug use: Never             5/10/2024   One time HIV Screening   Previous HIV test? No         5/10/2024   STI Screening   New sexual partner(s) since last STI/HIV test? No         5/10/2024   Contraception/Family Planning   Questions about contraception or family planning No     Reviewed and updated as needed this visit by Provider   Tobacco     Med Hx  Surg Hx  Fam Hx  Soc Hx Sexual Activity             Objective    Exam  /71 (BP Location: Left arm, Patient Position: Sitting, Cuff Size: Adult Small)   Pulse 74   Temp 97.5  F (36.4  C) (Temporal)   Resp (!) 7   Ht 1.785 m (5' 10.28\")   Wt 59.2 kg (130 lb 8 oz)   SpO2 97%   BMI 18.58 kg/m     Estimated body mass " "index is 18.58 kg/m  as calculated from the following:    Height as of this encounter: 1.785 m (5' 10.28\").    Weight as of this encounter: 59.2 kg (130 lb 8 oz).    Physical Exam  Constitutional:       General: He is not in acute distress.     Appearance: Normal appearance.   HENT:      Head: Normocephalic.      Right Ear: Tympanic membrane, ear canal and external ear normal.      Left Ear: Tympanic membrane, ear canal and external ear normal.      Mouth/Throat:      Mouth: Mucous membranes are moist.      Pharynx: No oropharyngeal exudate or posterior oropharyngeal erythema.   Eyes:      General: No scleral icterus.  Cardiovascular:      Rate and Rhythm: Normal rate and regular rhythm.      Heart sounds: No murmur heard.  Pulmonary:      Effort: Pulmonary effort is normal. No respiratory distress.      Breath sounds: Normal breath sounds.   Abdominal:      General: Abdomen is flat. Bowel sounds are normal.      Palpations: Abdomen is soft.   Lymphadenopathy:      Cervical: No cervical adenopathy.   Neurological:      General: No focal deficit present.      Mental Status: He is alert.   Psychiatric:         Mood and Affect: Mood normal.         Behavior: Behavior normal.         Signed Electronically by: Tapan Yeager DO    "

## 2024-05-15 NOTE — PATIENT INSTRUCTIONS
"Preventive Care Advice   This is general advice we often give to help people stay healthy. Your care team may have specific advice just for you. Please talk to your care team about your own preventive care needs.  Lifestyle  Exercise at least 150 minutes each week (30 minutes a day, 5 days a week).  Do muscle strengthening activities 2 days a week. These help control your weight and prevent disease.  No smoking.  Wear sunscreen to prevent skin cancer.  Have your home tested for radon every 2 to 5 years. Radon is a colorless, odorless gas that can harm your lungs. To learn more, go to www.[email or web address]. and search for \"Radon in Homes.\"  Keep guns unloaded and locked up in a safe place like a safe or gun vault, or, use a gun lock and hide the keys. Always lock away bullets separately. To learn more, visit [employer].[place].gov and search for \"safe gun storage.\"  Nutrition  Eat 5 or more servings of fruits and vegetables each day.  Try wheat bread, brown rice and whole grain pasta (instead of white bread, rice, and pasta).  Get enough calcium and vitamin D. Check the label on foods and aim for 100% of the RDA (recommended daily allowance).  Regular exams  Have a dental exam and cleaning every 6 months.  See your health care team every year to talk about:  Any changes in your health.  Any medicines your care team has prescribed.  Preventive care, family planning, and ways to prevent chronic diseases.  Shots (vaccines)   HPV shots (up to age 26), if you've never had them before.  Hepatitis B shots (up to age 59), if you've never had them before.  COVID-19 shot: Get this shot when it's due.  Flu shot: Get a flu shot every year.  Tetanus shot: Get a tetanus shot every 10 years.  Pneumococcal, hepatitis A, and RSV shots: Ask your care team if you need these based on your risk.  Shingles shot (for age 50 and up).  General health tests  Diabetes screening:  Starting at age 35, Get screened for diabetes at least every 3 years.  If " you are younger than age 35, ask your care team if you should be screened for diabetes.  Cholesterol test: At age 39, start having a cholesterol test every 5 years, or more often if advised.  Bone density scan (DEXA): At age 50, ask your care team if you should have this scan for osteoporosis (brittle bones).  Hepatitis C: Get tested at least once in your life.  Abdominal aortic aneurysm screening: Talk to your doctor about having this screening if you:  Have ever smoked; and  Are biologically male; and  Are between the ages of 65 and 75.  STIs (sexually transmitted infections)  Before age 24: Ask your care team if you should be screened for STIs.  After age 24: Get screened for STIs if you're at risk. You are at risk for STIs (including HIV) if:  You are sexually active with more than one person.  You don't use condoms every time.  You or a partner was diagnosed with a sexually transmitted infection.  If you are at risk for HIV, ask about PrEP medicine to prevent HIV.  Get tested for HIV at least once in your life, whether you are at risk for HIV or not.  Cancer screening tests  Cervical cancer screening: If you have a cervix, begin getting regular cervical cancer screening tests at age 21. Most people who have regular screenings with normal results can stop after age 65. Talk about this with your provider.  Breast cancer scan (mammogram): If you've ever had breasts, begin having regular mammograms starting at age 40. This is a scan to check for breast cancer.  Colon cancer screening: It is important to start screening for colon cancer at age 45.  Have a colonoscopy test every 10 years (or more often if you're at risk) Or, ask your provider about stool tests like a FIT test every year or Cologuard test every 3 years.  To learn more about your testing options, visit: [email or web address].  For help making a decision, visit: [email or web address].  Prostate cancer screening test: If you have a prostate and are age 55  to 69, ask your provider if you would benefit from a yearly prostate cancer screening test.  Lung cancer screening: If you are a current or former smoker age 50 to 80, ask your care team if ongoing lung cancer screenings are right for you.  For informational purposes only. Not to replace the advice of your health care provider. Copyright   2023 [employer]. All rights reserved. Clinically reviewed by the [facility]. [employer] [id number] - REV 04/24.    Learning About Stress  What is stress?     Stress is your body's response to a hard situation. Your body can have a physical, emotional, or mental response. Stress is a fact of life for most people, and it affects everyone differently. What causes stress for you may not be stressful for someone else.  A lot of things can cause stress. You may feel stress when you go on a job interview, take a test, or run a race. This kind of short-term stress is normal and even useful. It can help you if you need to work hard or react quickly. For example, stress can help you finish an important job on time.  Long-term stress is caused by ongoing stressful situations or events. Examples of long-term stress include long-term health problems, ongoing problems at work, or conflicts in your family. Long-term stress can harm your health.  How does stress affect your health?  When you are stressed, your body responds as though you are in danger. It makes hormones that speed up your heart, make you breathe faster, and give you a burst of energy. This is called the fight-or-flight stress response. If the stress is over quickly, your body goes back to normal and no harm is done.  But if stress happens too often or lasts too long, it can have bad effects. Long-term stress can make you more likely to get sick, and it can make symptoms of some diseases worse. If you tense up when you are stressed, you may develop neck, shoulder, or low back pain. Stress is  linked to high blood pressure and heart disease.  Stress also harms your emotional health. It can make you tyler, tense, or depressed. Your relationships may suffer, and you may not do well at work or school.  What can you do to manage stress?  You can try these things to help manage stress:   Do something active. Exercise or activity can help reduce stress. Walking is a great way to get started. Even everyday activities such as housecleaning or yard work can help.  Try yoga or cathleen chi. These techniques combine exercise and meditation. You may need some training at first to learn them.  Do something you enjoy. For example, listen to music or go to a movie. Practice your hobby or do volunteer work.  Meditate. This can help you relax, because you are not worrying about what happened before or what may happen in the future.  Do guided imagery. Imagine yourself in any setting that helps you feel calm. You can use online videos, books, or a teacher to guide you.  Do breathing exercises. For example:  From a standing position, bend forward from the waist with your knees slightly bent. Let your arms dangle close to the floor.  Breathe in slowly and deeply as you return to a standing position. Roll up slowly and lift your head last.  Hold your breath for just a few seconds in the standing position.  Breathe out slowly and bend forward from the waist.  Let your feelings out. Talk, laugh, cry, and express anger when you need to. Talking with supportive friends or family, a counselor, or a [place] leader about your feelings is a healthy way to relieve stress. Avoid discussing your feelings with people who make you feel worse.  Write. It may help to write about things that are bothering you. This helps you find out how much stress you feel and what is causing it. When you know this, you can find better ways to cope.  What can you do to prevent stress?  You might try some of these things to help prevent stress:  Manage your time.  "This helps you find time to do the things you want and need to do.  Get enough sleep. Your body recovers from the stresses of the day while you are sleeping.  Get support. Your family, friends, and community can make a difference in how you experience stress.  Limit your news feed. Avoid or limit time on social media or news that may make you feel stressed.  Do something active. Exercise or activity can help reduce stress. Walking is a great way to get started.  Where can you learn more?  Go to https://www.[employer].net/patiented  Enter N032 in the search box to learn more about \"Learning About Stress.\"  Current as of: October 24, 2023               Content Version: 14.0    9001-9502 [employer].   Care instructions adapted under license by your healthcare professional. If you have questions about a medical condition or this instruction, always ask your healthcare professional. [employer] disclaims any warranty or liability for your use of this information.      "

## 2025-06-28 ENCOUNTER — HEALTH MAINTENANCE LETTER (OUTPATIENT)
Age: 25
End: 2025-06-28